# Patient Record
Sex: MALE | Race: WHITE | Employment: FULL TIME | URBAN - METROPOLITAN AREA
[De-identification: names, ages, dates, MRNs, and addresses within clinical notes are randomized per-mention and may not be internally consistent; named-entity substitution may affect disease eponyms.]

---

## 2017-09-14 ENCOUNTER — HOSPITAL ENCOUNTER (EMERGENCY)
Age: 62
Discharge: HOME | End: 2017-09-14
Attending: EMERGENCY MEDICINE
Payer: COMMERCIAL

## 2017-09-14 VITALS
SYSTOLIC BLOOD PRESSURE: 191 MMHG | RESPIRATION RATE: 16 BRPM | HEIGHT: 64 IN | TEMPERATURE: 98.1 F | OXYGEN SATURATION: 94 % | BODY MASS INDEX: 34.51 KG/M2 | HEART RATE: 62 BPM | WEIGHT: 202.13 LBS | DIASTOLIC BLOOD PRESSURE: 87 MMHG

## 2017-09-14 DIAGNOSIS — S61.219A LACERATION OF FINGER, INITIAL ENCOUNTER: Primary | ICD-10-CM

## 2017-09-14 PROCEDURE — 74011250636 HC RX REV CODE- 250/636: Performed by: PHYSICIAN ASSISTANT

## 2017-09-14 PROCEDURE — 90471 IMMUNIZATION ADMIN: CPT

## 2017-09-14 PROCEDURE — 74011000250 HC RX REV CODE- 250: Performed by: PHYSICIAN ASSISTANT

## 2017-09-14 PROCEDURE — 99283 EMERGENCY DEPT VISIT LOW MDM: CPT

## 2017-09-14 PROCEDURE — 77030018836 HC SOL IRR NACL ICUM -A

## 2017-09-14 PROCEDURE — 77030031132 HC SUT NYL COVD -A

## 2017-09-14 PROCEDURE — 75810000293 HC SIMP/SUPERF WND  RPR

## 2017-09-14 PROCEDURE — 90715 TDAP VACCINE 7 YRS/> IM: CPT | Performed by: PHYSICIAN ASSISTANT

## 2017-09-14 RX ORDER — BACITRACIN 500 UNIT/G
1 PACKET (EA) TOPICAL
Status: COMPLETED | OUTPATIENT
Start: 2017-09-14 | End: 2017-09-14

## 2017-09-14 RX ORDER — LIDOCAINE HYDROCHLORIDE 10 MG/ML
5 INJECTION INFILTRATION; PERINEURAL ONCE
Status: COMPLETED | OUTPATIENT
Start: 2017-09-14 | End: 2017-09-14

## 2017-09-14 RX ADMIN — BACITRACIN 1 PACKET: 500 OINTMENT TOPICAL at 16:43

## 2017-09-14 RX ADMIN — TETANUS TOXOID, REDUCED DIPHTHERIA TOXOID AND ACELLULAR PERTUSSIS VACCINE, ADSORBED 0.5 ML: 5; 2.5; 8; 8; 2.5 SUSPENSION INTRAMUSCULAR at 16:43

## 2017-09-14 RX ADMIN — LIDOCAINE HYDROCHLORIDE 5 ML: 10 INJECTION, SOLUTION INFILTRATION; PERINEURAL at 16:43

## 2017-09-14 NOTE — ED PROVIDER NOTES
HPI Comments: 60year old male presenting for finger laceration. No other complaints. PMHx: DM, lung CA, HTN, pericarditis, gout  Social: former smoker. . Patient is a 60 y.o. male presenting with skin laceration. The history is provided by the patient. Laceration    The incident occurred less than 1 hour ago. Pain location: right 2nd finger. The laceration is 2 cm in size. The injury mechanism is a clean knife (pt was attempting to remove a battery compartment panel using a knife when it slipped). The pain is at a severity of 4/10. The pain is mild. The pain has been constant since onset. Pertinent negatives include no numbness and no loss of motion. It is unknown when the patient last had a tetanus shot. 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 No pertinent family history. Social History     Social History    Marital status:      Spouse name: N/A    Number of children: N/A    Years of education: N/A     Occupational History    Not on file. Social History Main Topics    Smoking status: Former Smoker     Packs/day: 0.00    Smokeless tobacco: Not on file    Alcohol use Yes      Comment: occasional    Drug use: Not on file    Sexual activity: Not on file     Other Topics Concern    Not on file     Social History Narrative         ALLERGIES: Review of patient's allergies indicates no known allergies. Review of Systems   Constitutional: Negative for fever. Respiratory: Negative for shortness of breath. Cardiovascular: Negative for chest pain. Musculoskeletal: Negative for arthralgias. Skin: Positive for wound. Neurological: Negative for numbness.    All other systems reviewed and are negative. Vitals:    09/14/17 1540   BP: 191/87   Pulse: 62   Resp: 16   Temp: 98.1 °F (36.7 °C)   SpO2: 94%   Weight: 91.7 kg (202 lb 2 oz)   Height: 5' 4\" (1.626 m)            Physical Exam   Constitutional: He is oriented to person, place, and time. He appears well-developed and well-nourished. Pleasant WM   HENT:   Head: Normocephalic. Eyes: Conjunctivae are normal.   Neck: Neck supple. Cardiovascular: Normal rate. Pulmonary/Chest: Effort normal. No respiratory distress. Abdominal: He exhibits no distension. Musculoskeletal: Normal range of motion. 2.5cm linear laceration to the right 2nd finger pad. Full ROM, NVID. Neurological: He is alert and oriented to person, place, and time. Skin: Skin is warm and dry. Psychiatric: He has a normal mood and affect. MDM  Number of Diagnoses or Management Options  Diagnosis management comments: 60year old male presenting for finger pad laceration sustained while trying to use a knife to open a battery compartment. Full ROM, NVID. Wound irrigated, repaired, care instructions given. Amount and/or Complexity of Data Reviewed  Discuss the patient with other providers: yes (Dr. Eliana Toledo, ED attending)      ED Course       Wound Repair  Date/Time: 9/14/2017 5:34 PM  Performed by: [employer] provider: Dr. Eliana Toledo  Preparation: skin prepped with Shur-Clens  Pre-procedure re-eval: Immediately prior to the procedure, the patient was reevaluated and found suitable for the planned procedure and any planned medications. Location: right 2nd finger pad.   Wound length:2.5 cm or less (2.5cm)  Anesthesia: digital block    Anesthesia:  Local Anesthetic: lidocaine 1% without epinephrine  Anesthetic total: 3 mL  Foreign bodies: no foreign bodies  Irrigation solution: saline  Irrigation method: syringe  Debridement: none  Skin closure: 5-0 nylon  Number of sutures: 3  Technique: simple and interrupted  Approximation: close  Dressing: antibiotic ointment  Patient tolerance: Patient tolerated the procedure well with no immediate complications  My total time at bedside, performing this procedure was 16-30 minutes.   Comments: Wound irrigated and explored

## 2017-09-14 NOTE — DISCHARGE INSTRUCTIONS
We hope that we have addressed all of your medical concerns. The examination and treatment you received in the Emergency Department were for an emergent problem and were not intended as complete care. It is important that you follow up with your healthcare provider(s) for ongoing care. If your symptoms worsen or do not improve as expected, and you are unable to reach your usual health care provider(s), you should return to the Emergency Department. Today's healthcare is undergoing tremendous change, and patient satisfaction surveys are one of the many tools to assess the quality of medical care. You may receive a survey from the [employer] regarding your experience in the Emergency Department. I hope that your experience has been completely positive, particularly the medical care that I provided. As such, please participate in the survey; anything less than excellent does not meet my expectations or intentions. Formerly [facility], [id number] [facility] and [address] [place] [address] participate in nationally recognized quality of care measures. If your blood pressure is greater than 120/80, as reported below, we urge that you seek medical care to address the potential of high blood pressure, commonly known as hypertension. Hypertension can be hereditary or can be caused by certain medical conditions, pain, stress, or \"white coat syndrome. \"       Please make an appointment with your health care provider(s) for follow up of your Emergency Department visit. VITALS:   Patient Vitals for the past 8 hrs:   Temp Pulse Resp BP SpO2   09/14/17 1540 98.1 °F (36.7 °C) 62 16 191/87 94 %          Thank you for allowing us to provide you with medical care today. We realize that you have many choices for your emergency care needs. Please choose us in the future for any continued health care needs. Kb Tamez, [facility].   Office: [phone]            No results found for this or any previous visit (from the past 24 hour(s)). No results found. Cuts on the Hand Closed With Stitches: Care Instructions  Your Care Instructions    A cut on your hand can be on your fingers, your thumb, or the front or back of your hand. Sometimes a cut can injure the tendons, blood vessels, or nerves of your hand. The doctor used stitches to close the cut. Using stitches also helps the cut heal and reduces scarring. The doctor may have given you a splint to help prevent you from moving your hand, fingers, or thumb. If the cut went deep and through the skin, the doctor put in two layers of stitches. The deeper layer brings the deep part of the cut together. These stitches will dissolve and don't need to be removed. The stitches in the upper layer are the ones you see on the cut. You will probably have a bandage. You will need to have the stitches removed, usually in 7 to 14 days. The doctor may suggest that you see a hand specialist if the cut is very deep or if you have trouble moving your fingers or have less feeling in your hand. The doctor has checked you carefully, but problems can develop later. If you notice any problems or new symptoms, get medical treatment right away. Follow-up care is a key part of your treatment and safety. Be sure to make and go to all appointments, and call your doctor if you are having problems. It's also a good idea to know your test results and keep a list of the medicines you take. How can you care for yourself at home? · Keep the cut dry for the first 24 to 48 hours. After this, you can shower if your doctor okays it. Pat the cut dry. · Don't soak the cut, such as in a bathtub. Your doctor will tell you when it's safe to get the cut wet. · If your doctor told you how to care for your cut, follow your doctor's instructions.  If you did not get instructions, follow this general advice:  ¨ After the first 24 to 48 hours, wash around the cut with clean water 2 times a day. Don't use hydrogen peroxide or alcohol, which can slow healing. ¨ You may cover the cut with a thin layer of petroleum jelly, such as Vaseline, and a nonstick bandage. ¨ Apply more petroleum jelly and replace the bandage as needed. · Prop up the sore hand on a pillow anytime you sit or lie down during the next 3 days. Try to keep it above the level of your heart. This will help reduce swelling. · Avoid any activity that could cause your cut to reopen. · Do not remove the stitches on your own. Your doctor will tell you when to come back to have the stitches removed. · Be safe with medicines. Take pain medicines exactly as directed. ¨ If the doctor gave you a prescription medicine for pain, take it as prescribed. ¨ If you are not taking a prescription pain medicine, ask your doctor if you can take an over-the-counter medicine. When should you call for help? Call your doctor now or seek immediate medical care if:  · You have new pain, or your pain gets worse. · The skin near the cut is cold or pale or changes color. · You have tingling, weakness, or numbness near the cut. · The cut starts to bleed, and blood soaks through the bandage. Oozing small amounts of blood is normal.  · You have trouble moving the area of the hand near the cut. · You have symptoms of infection, such as:  ¨ Increased pain, swelling, warmth, or redness around the cut. ¨ Red streaks leading from the cut. ¨ Pus draining from the cut. ¨ A fever. Watch closely for changes in your health, and be sure to contact your doctor if:  · You do not get better as expected. Where can you learn more? Go to http://nyla-j carlos.info/. Enter T250 in the search box to learn more about \"Cuts on the Hand Closed With Stitches: Care Instructions. \"  Current as of: March 20, 2017  Content Version: 11.3  © 3712-8552 [employer].  Care instructions adapted under license by [employer] (which disclaims liability or warranty for this information). If you have questions about a medical condition or this instruction, always ask your healthcare professional. [address] any warranty or liability for your use of this information.

## 2017-09-14 NOTE — ED TRIAGE NOTES
Pt stated he cut his right 2nd finger pta on a knife , deep laceration noted to pad of finger, pressure dressing applied in triage

## 2017-09-14 NOTE — ED NOTES
Pt given discharge instructions, patient education, and follow up information. Pt states understanding. All questions answered. Pt discharged to home in private vehicle, ambulatory. Pt A/Ox4, RA, pain controlled.  Pt ambulatory out before repeat set of VS.

## 2018-11-29 ENCOUNTER — HOSPITAL ENCOUNTER (OUTPATIENT)
Dept: GENERAL RADIOLOGY | Age: 63
Discharge: HOME | End: 2018-11-29
Payer: COMMERCIAL

## 2018-11-29 DIAGNOSIS — R05.9 COUGH: ICD-10-CM

## 2018-11-29 PROCEDURE — 71046 X-RAY EXAM CHEST 2 VIEWS: CPT

## 2020-11-20 ENCOUNTER — VIRTUAL VISIT (OUTPATIENT)
Dept: DIABETES SERVICES | Age: 65
End: 2020-11-20
Payer: MEDICARE

## 2020-11-20 DIAGNOSIS — E11.9 TYPE 2 DIABETES MELLITUS WITHOUT COMPLICATION, WITHOUT LONG-TERM CURRENT USE OF INSULIN (HCC): Primary | ICD-10-CM

## 2020-11-20 PROCEDURE — G0108 DIAB MANAGE TRN  PER INDIV: HCPCS

## 2020-11-20 NOTE — PROGRESS NOTES
[facility] Program for Diabetes Health  Diabetes Self-Management Education   Pre-program Assessment    Reason for Referral: DSMES  Referral Source: Jose Guadalupe Bustamante MD    Metric Patient responses (11/20/2020)   A1c  (Goal: 7%)   Recent value:  Pt. Reports last A1c 7.2% 8/24/2020, up from 6.2% in 2019    See Diabetes Educator note under recommendation below. Healthy Eating     24-hour Dietary Recall:  Breakfast:    Lunch:    Dinner: Hamburger on white bun, fries, regular ketchup, diet tea  Snacks: when \"bored\"  Beverages: water, diet tea  Alcohol: no    Stage of change: Preparation     See Diabetes Educator note under recommendation below. Being Active     Physical Activity Vital Sign:  How many days during the past week have you performed physical activity where your heart beats faster and your breathing is harder than normal for 30 minutes or more? 3 days - walking the dogs    How many days in a typical week do you perform activity such as this?  3 days    Stage of change: Action     Monitoring Do you monitor your blood sugar? Yes    How often do you monitor? 1x/day    What are the range of readings? 145-150 mg/dL  Breakfast: n/a mg/dL  Lunch: n/a mg/dL  Dinner: n/a mg/dL  Bedtime: n/a mg/dL    Do you know your last A1c measurement? Yes     Do you know the meaning of the A1c? Yes    Stage of change: Action     See Diabetes Educator note under recommendation below. Taking Medications Medication Management:  Do you understand what your diabetes medications do? No    Can you afford your diabetes medications? Yes, however stopped dulaglutide when he ran out because of the cost.    How often do you miss doses of your diabetes medications? Never    Current dosing:   Key Antihyperglycemic Medications             metformin (GLUCOPHAGE) 500 mg tablet Take 500 mg by mouth two (2) times daily (with meals).       Patient also reports he is now taking 1000 mg of Metformin 2x/day and glimepiride 4 mg, 2x/day. Blood Pressure Management:  Key ACE/ARB Medications             losartan (COZAAR) 100 mg tablet Take 100 mg by mouth daily. Patient reports he is not taking losartan. Reports taking atenolol, amlodipine    Lipid Management:  Key Antihyperlipidemia Meds     The patient is on no antihyperlipidemia meds. Patient reports taking atorvastatin    Clot Prevention:  Key Anti-Platelet Anticoagulant Meds     The patient is on no antiplatelet meds or anticoagulants. Stage of change: Preparation            Healthy Coping Diabetes Skills, Confidence and Preparedness Index: Total score: 4.2  Skills: 3.9  Confidence: 4.1  Preparedness: 4.8    Stage of change: Preparation     Overall SCPI score: 4.2 Skills Score: 3.9  Low: Taking Medication(Q2),Healthy Coping(Q7),Blood Sugar Monitoring(Q8) Confidence Score: 4.1  Low: Healthy Eating(Q1),Healthy JTUDSJ(K0) Preparedness Score: 4.8  Low: Healthy Coping(Q3)  Healthy Eating Score: 3.0  Low: Confidence(Q1),Confidence(Q4) Taking Medication Score: 2.0  Low: Skills(Q2) Blood Sugar Monitoring Score: 4.2  Low: VRSQMI(C2) Reducing Risks Score: 4.8  Low: Skills(Q5),Confidence(Q3),Preparedness(Q4)  Problem Solving Score: 5.3  Low: Skills(Q6) Healthy Coping Score: 3.3  Low: Skills(Q7),Preparedness(Q3) Being Active Score: 6.5  Low: Confidence(Q5)     Reducing Risks Vaccines:  Influenza:10/2020  Pneumococcal: Has had, not sure of date. Hepatitis: Has had, not sure of date.   Examinations:  No Diabetic HM Topics for this patient     Dental exam: Last appointment was: fall 2020    Foot exam: PCP checks, pt checks daily    Heart Protection:  BP Readings from Last 2 Encounters:   09/14/17 191/87   10/04/14 137/78        No results found for: LDL, LDLC, DLDLP     Kidney Protection:  No results found for: MCACR, MCA1, MCA2, MCA3, MCAU, MCAU2, MCALPOCT    Patient-reported diabetes complications:  none    Stage of change: Action     Problem Solving Hypoglycemia Management:  What are signs and symptoms of hypoglycemia that you experience? pt reports not having hypoglycemia    How do you prevent hypoglycemia? Consistent meals/snack times and Pt reported being unaware of how to prevent hypoglycemia    How do you treat hypoglycemia? \"eat sugar\"    Hyperglycemia Management:  What are signs and symptoms of hyperglycemia that you experience? Pt reports when blood sugar changes up or down rapidly his eyes water. How can you prevent hyperglycemia? Take medication as instructed, Monitor blood glucose, Stay free of illness    Sick Day Management:  What do you do differently on sick days? Pt reports no illness in several years    Pattern Management:  Do you notice blood glucose patterns when you look at the readings in your meter or logbook? No    How do you use the blood glucose readings from your meter or logbook? Pt reported being unaware of pattern management skills    Stage of change: Preparation   Note: Content derived from the American Association of Diabetes Educators' Diabetes Education Curriculum: A Guide to Successful Self-Management (2nd edition)         Diabetes Educator Recommendations:   - Address diabetes self-care behaviors through education and support using AADE7 Curriculum. Pt to attend weekly individual sessions on reducing risks, healthy eating, being active, monitoring, taking medications, healthy coping and problem solving.     - Patient intends to focus on these diabetes self-care practices: Reducing risks, Healthy eating, Monitoring, Physical activity, Taking medications, Healthy coping and Problem solving     - Detailed Diabetes Educator note/recommendation:  I met with . Priscila Hattie today for the diabetes self-management education pre-assessment. He was diagnosed with T2DM at about age 53 and has been on a variety of DM medications and recommended meal plans over the years, he is knowledgeable about diabetes and his meds.   He is less informed about nutrition and changes in nutrition recommendations since first being diagnosed. Nutrition is his primary focus for education. In 2019 his A1c was 6.2%, as of 8/2020 it is 7.2%, he was on dulaglutide but stopped taking when the prescription ran out because of the out-of-pocket cost.  He was then started on glimepiride (about 3 months ago). His numbers started to increase significantly, occasionally reaching the 200s  He wonders if the topical testosterone he was using is related to the increase, he is now taking testosterone by injection and he has noticed his numbers coming down. The glimepiride dose was doubled about 3 weeks ago and he began to see his BGLs back down to the levels he had on the dulaglutide - fasting 140-150 consistently. He would like to further decrease his BGLs and get his A1c below 7% and closer to the 6.2% he was at last year. In terms of reducing risks, Mr. Ariella Gilbert checks his feet daily, sees PCP appropriately, is up to date on vaccinations, and reports no signs or symptoms typical of high blood sugars. He does state that when his sugars rise or drop rapidly his eyes water. Mr. Ariella Gilbert has no barriers to learning, can afford his medications and food. His wife is the  in the house, he does the grocery shopping. He states he can begin incorporating exercise into his schedule. He rates his overall health as \"fair at best.\"  He denies missing any doses of medication and is consistent with BGL monitoring. Mr. Ariella Gilbert appears to be eager to learn more about carb counting, Healthy Plate, and physical activity to help bring down his A1c.       - Pt to follow up with provider on the following: Pt stopped taking dulaglutide due to cost, may be interested in exenatide pen injector Bydureon BCise if cost effective.        Diabetes Self-Management Education Follow-up Visit: [id number]    JBTTB-91 Public Health Emergency Adaptations for Telehealth:  Ravindra Langford is a 76 y.o. male being evaluated through a synchronous (real-time) audio-video technology platform, as a substitution for an in-person encounter, to address the healthcare issues mentioned above. I was in the office. The patient was at home. A caregiver was present when appropriate. The patient and/or his healthcare decision maker, is aware that this patient-initiated, Telehealth encounter on 11/20/2020 is a billable service, with coverage as determined by his insurance carrier. He is aware that he may receive a bill and has provided verbal consent to proceed: Yes. This telehealth encounter occurred during the COVID-19 pandemic and public health emergency. Evaluation of the following organ systems was limited: Vitals/Constitutional/EENT/Resp/CV/GI//MS/Neuro/Skin/Heme-Lymph-Imm. Pursuant to the emergency declaration under the [address] [place] [address], [address] [place] [address] authority and the [employer] and [employer] Act, this Virtual Visit was conducted with patient's (and/or legal guardian's) consent, to reduce the risk of exposure to COVID-19 and provide necessary medical care. --Amie Doherty RN on 11/20/2020 at 12:00 PM    An electronic signature was used to authenticate this note.  I was in the office for the appointment and time spent: 45 minutes

## 2020-11-23 ENCOUNTER — VIRTUAL VISIT (OUTPATIENT)
Dept: DIABETES SERVICES | Age: 65
End: 2020-11-23
Payer: MEDICARE

## 2020-11-23 DIAGNOSIS — E11.65 TYPE 2 DIABETES MELLITUS WITH HYPERGLYCEMIA, WITHOUT LONG-TERM CURRENT USE OF INSULIN (HCC): Primary | ICD-10-CM

## 2020-11-23 PROCEDURE — G0108 DIAB MANAGE TRN  PER INDIV: HCPCS

## 2020-11-23 NOTE — PROGRESS NOTES
[facility] Program for Diabetes Health  Diabetes Self-Management Education   Education and Goal Plan for Session #1 and first half of 2    AADE7 Self-Care Behaviors:  Behavior Education Completed (11/23/2020)   Healthy Eating   - Impact of food on blood glucose levels  - Food sources of carbohydrates  - Meal size and portions  - Reading food labels  - Balanced plate  - Meal and snack timing  - Carbohydrate counting   - Importance of a variety of foods  - Heart healthy fats  - Reducing sodium  - Importance of not skipping meals  - Eating breakfast  - Controlling portions of carbohydrates  - Reducing sugar-sweetened beverages  - Improving water intake  - Comparing food choices  - Alcohol and diabetes  - Meal consistency     See Diabetes Educator note under recommendation below. Being Active   Not addressed during this session. Monitoring     Not addressed during this session. Taking Medications Not addressed during this session. Healthy Coping Not addressed during this session. Reducing Risks - Prevention of influenza  - Foot care and foot exam  - Dilated eye exam  - Prevention of pneumonia  - Prevention of hepatitis B  - Dental care and dental exam  - Hgb A1c target  - Long-term complications     See Diabetes Educator note under recommendation below. Problem Solving Not addressed during this session.    Note: Content derived from the American Association of Diabetes Educators' Diabetes Education Curriculum: A Guide to Successful Self-Management (2nd edition)         Diabetes Educator Recommendations:     - Continue addressing diabetes self-care behaviors through education and support in AADE7 Curriculum individual sessions on reducing risks, healthy eating, being active, monitoring, taking medications, healthy coping and problem solving.     - Continue practicing knowledge and skills relating to reducing risks, healthy eating and monitoring, being active and medications, healthy coping and problem solving, healthy eating, monitoring, being active, medications, healthy coping and problem solving to improve diabetes self-management. - Detailed Diabetes Educator note/recommendation:  I met with Mr. John Salazar today to discuss What is Diabetes, How Do I Stay Healthy, and Nutrition. He is very knowledgeable and proactive about the risks of T2DM and preventative care, so most of our time was spent focusing on nutrition. Last week his fasting BGLs ranged from 149-230. He stated the 230 was higher than usual but the 149-200 range is typical.     While talking about the risks and complications of Y1XM, we covered sleep apnea and Mr. John Salazar wondered if his CPAP machine needs to be replaced, or checked. He asked if his machine is not properly functioning could that also impact his BGLs. I agreed that the stress on his body related to sleep apnea could certainly raise his BGLs, he will check into having the machine checked or replaced. On the topic of nutrition we discussed the impact of foods (proteins, fats, and carbs) on blood glucose levels, the importance of eating three meals a day/not skipping meals/importance of breakfast, how to count carbs/carb counts between 45-60 per meal, snacking, reading food lables, portions vs servings, heart healthy fats vs unhealthy and trans fats, Healthy Plate, and nutrition apps/websites approved by the ADA. He has used Healthy Plate in the past, but carb counting is new to him. He expressed understanding how to count the carbs and some disappointment about \"limiting the amount of food\" he will be eating at each meal. To counter the smaller portions of the carb heavy foods, we discussed which foods he can have in larger portions and how the addition of fiber/whole grains can aid with satiety. Mr. John Salazar has expressed that nutrition is his biggest barrier to lowering his BGLs and A1c.   He stated that when grocery shopping, he will not buy foods that have carbs or sugars above a certain value, but admits he does not apply similar limitations to the total carbs in each meal or serving sizes. We also discussed how carb counting does not limit the foods he can have, only the number of carbs, which means he does not have to eliminate the foods he loves or foods at special occasions. He understands this and is willing to try counting carbs for 5 dinners, log what he ate, and his fasting BGL the next day before our next visit to evaluate the effects on his blood sugar. Next week we will talk about monitoring in detail along with physical activity. We will review his BGLs and meal choices as well. - Patient's Identified SMART Goal(s): - Practice healthy eating self-care behavior by counting carbs for dinner, logging what was eaten at dinner, and logging fasting BGL the following morning. over the next week. - Pt to follow up with provider on the following: Elevated liver enzyme levels, patient has questions about the values and possible \"fatty liver\" diagnosis. Also, having his CPAP machine checked and/or getting a newer machine. Diabetes Self-Management Education Follow-up Visit: 21/7/0849    [place] [address] Public Health Emergency Adaptations for Telehealth:    Mily Bell is a 76 y.o. male being evaluated through a synchronous (real-time) audio-video technology platform, as a substitution for an in-person encounter, to address the healthcare issues mentioned above. I was in the office. The patient was at home. A caregiver was present when appropriate. The patient and/or his healthcare decision maker, is aware that this patient-initiated, Telehealth encounter on 11/23/2020 is a billable service, with coverage as determined by his insurance carrier. He is aware that he may receive a bill and has provided verbal consent to proceed: Yes. This telehealth encounter occurred during the COVID-19 pandemic and public health emergency.  Evaluation of the following organ systems was limited: Vitals/Constitutional/EENT/Resp/CV/GI//MS/Neuro/Skin/Heme-Lymph-Imm. Pursuant to the emergency declaration under the [address] [place] [address] and the [employer] and [employer] Act, this Virtual Visit was conducted with patient's (and/or legal guardian's) consent, to reduce the risk of exposure to COVID-19 and provide necessary medical care. --Catherine Montelongo RN on 11/23/2020 at 2:40 PM    An electronic signature was used to authenticate this note.  I was in the office for the appointment and time spent: 95 minutes

## 2020-12-03 ENCOUNTER — VIRTUAL VISIT (OUTPATIENT)
Dept: DIABETES SERVICES | Age: 65
End: 2020-12-03
Payer: MEDICARE

## 2020-12-03 DIAGNOSIS — E11.9 TYPE 2 DIABETES MELLITUS WITHOUT COMPLICATION, WITHOUT LONG-TERM CURRENT USE OF INSULIN (HCC): Primary | ICD-10-CM

## 2020-12-03 PROCEDURE — G0108 DIAB MANAGE TRN  PER INDIV: HCPCS

## 2020-12-03 NOTE — PROGRESS NOTES
[facility] for Diabetes Health  Diabetes Self-Management Education   Education and Goal Plan for Session #2 - postponed remaining material until 12/10  - patient has not received his patient handbook in the mail. This session included reviewing the results of the patient's SMART Goal, carb counting, BGLs    AADE7 Self-Care Behaviors:  Behavior Education Completed (12/3/2020)   Healthy Eating   - Food sources of carbohydrates  - Meal size and portions  - Carbohydrate counting      Being Active   Not addressed during this session. Monitoring     - Frequency of monitoring  - Logging blood glucose results   Taking Medications - Medication review   Healthy Coping Not addressed during this session. Reducing Risks Not addressed during this session. Problem Solving Not addressed during this session. Note: Content derived from the American Association of Diabetes Educators' Diabetes Education Curriculum: A Guide to Successful Self-Management (2nd edition)         Diabetes Educator Recommendations:     - Continue addressing diabetes self-care behaviors through education and support in AADE7 Curriculum individual sessions on reducing risks, healthy eating, being active, monitoring, taking medications, healthy coping and problem solving.     - Continue practicing knowledge and skills relating to reducing risks, healthy eating and monitoring, being active and medications, healthy coping and problem solving, healthy eating, monitoring, being active, medications, healthy coping and problem solving to improve diabetes self-management. - Detailed Diabetes Educator note/recommendation:  Mr. Robert Thibodeaux and I met today to cover the second half of session 2 - Monitoring and session 3 - Medications and Physical Activity, however because Mr. Robert Thibodeaux has not yet received his patient handbook we decided to reschedule those topics for 12/10/2020. Today we discussed his SMART goal of counting carbs at 5 dinners/7 dinners. He accomplished his goal and he found that the carbs in his current diet do fall within the recommended 45-60 grams carb/meal.  His fasting blood sugars continue to run higher than he would like ranging between 190s-230s. With Mr. Springer Doyne well controlled and his daily physical activity set where it is, his medications are the next element that may need to be considered. Mr. Ricky Sofia stated that he plans to call his PCP to discuss his medications and the possibility that his testosterone injections are raising his glucose. Before the glimepiride, that Mr. Ricky Sofia is on now, he took dulaglutide , which worked much better for him, however he stopped taking it because of the expense. We reviewed the natural progression of diabetes, and that it is common for patients to be on more than one diabetes medication at a time because of how and where they work in the body. He expressed understanding. After our visit I consulted with Dr. Kristen Kang, medical director of the Program for Diabetes Health, about the possibility that Mr. Juan Ferrell blood sugars are higher because he is taking testosterone injections, the answer is no. Next week we will resume DSMES according to the curriculum. - Patient's Identified SMART Goal(s): - New goal to be set on 12/10/2020     - Pt to follow up with provider on the following: Possible medication changes to better control blood sugars. Diabetes Self-Management Education Follow-up Visit: 70/06/0661    [id number] Public Health Emergency Adaptations for Telehealth:    Ghazala Riley is a 76 y.o. male being evaluated through a synchronous (real-time) audio-video technology platform, as a substitution for an in-person encounter, to address the healthcare issues mentioned above. I was in the office. The patient was at home. A caregiver was present when appropriate.  The patient and/or his healthcare decision maker, is aware that this patient-initiated, Telehealth encounter on 12/3/2020 is a billable service, with coverage as determined by his insurance carrier. He is aware that he may receive a bill and has provided verbal consent to proceed: Yes. This telehealth encounter occurred during the COVID-19 pandemic and public health emergency. Evaluation of the following organ systems was limited: Vitals/Constitutional/EENT/Resp/CV/GI//MS/Neuro/Skin/Heme-Lymph-Imm. Pursuant to the emergency declaration under the [address] [place] [address], [address] [place] [address] authority and the [employer] and [employer] Act, this Virtual Visit was conducted with patient's (and/or legal guardian's) consent, to reduce the risk of exposure to COVID-19 and provide necessary medical care. --Jessica Torres RN on 12/3/2020 at 12:26 PM    An electronic signature was used to authenticate this note.  I was in the office for the appointment and time spent: 27   minutes

## 2020-12-10 ENCOUNTER — VIRTUAL VISIT (OUTPATIENT)
Dept: DIABETES SERVICES | Age: 65
End: 2020-12-10

## 2020-12-10 DIAGNOSIS — E11.65 TYPE 2 DIABETES MELLITUS WITH HYPERGLYCEMIA, WITHOUT LONG-TERM CURRENT USE OF INSULIN (HCC): Primary | ICD-10-CM

## 2020-12-10 NOTE — PROGRESS NOTES
[address] [place] [address] for Diabetes Health  Diabetes Self-Management Education   Education and Goal Plan for Session #2 (second half - monitoring) and #3    AADE7 Self-Care Behaviors:  Behavior Education Completed (12/10/2020)   Healthy Eating   - Impact of food on blood glucose levels  - Food sources of carbohydrates  - Meal size and portions  - Carbohydrate counting   - Heart healthy fats     Being Active   - Effects of exercise on blood glucose  - Goals for exercise  - Types of exercise     Monitoring     - ADA blood glucose targets  - Frequency of monitoring  - Blood glucose schedule  - A1c interpretation  - Monitoring blood glucose trends per meter  - Alternative site testing  - Glucose monitoring technique  - Disposal of used lancets or needles  - Logging blood glucose results   Taking Medications - Medication review  - Medication schedule  - Insulin: Rapid-acting and long-acting   Healthy Coping Not addressed during this session. Reducing Risks Not addressed during this session. Problem Solving - Hypoglycemia signs/symptoms  - Hypoglycemia prevention  - Hypoglycemia treatment: Rule of 15   Note: Content derived from the American Association of Diabetes Educators' Diabetes Education Curriculum: A Guide to Successful Self-Management (2nd edition)         Diabetes Educator Recommendations:     - Continue addressing diabetes self-care behaviors through education and support in AADE7 Curriculum individual sessions on reducing risks, healthy eating, being active, monitoring, taking medications, healthy coping and problem solving.     - Continue practicing knowledge and skills relating to reducing risks, healthy eating and monitoring, being active and medications, healthy coping and problem solving, healthy eating, monitoring, being active, medications, healthy coping and problem solving to improve diabetes self-management.     - Detailed Diabetes Educator note/recommendation:  I met with Mr. Raquel Albright today, we covered monitoring, medications, and physical activity. Mr. Mine Perry BGLs continue to run higher than he expects with fasting and post dinner numbers both running 175-200s. He has been using the Carb Manager leanna for 1 week to log and calculate his per meal carbs. Mr. Tia Ryan states his mealtime carb counts are consistently below or between 45-60 g. He eats the same breakfast daily with  A total of 32g carbs. The following are the carbs for lunch between 12/1 and 12/5: 32, 48, 50, 28, and 72. For dinner same dates: 32, 79, skipped, 72, 56. After reviewing the numbers we discussed the carb counts in the 70s (pizza/rice) and how even though other meals may fall within the 45-60 g or less carb range, those carb heavy meals do significantly increase BGLs. We further discussed that the carbs at each meal need to be consistently in range and his range may need to be tighter to have a greater impact on his BGLs. He has agreed to try keeping his dinner carbs between 45-55 g so we can see what effect it has on his BGLs next week. He half jokingly states that I am trying \"to starve him to death. \" in response we reviewed low carb options like non-starchy vegetables that he can have second servings of without impacting his BGLs. I also asked that he compare the carb counts on the Carb Manager leanna to other sources, like the [employer], as it is an [employer] and I want to make sure the information he is getting is correct. He expressed understanding. Mr. Tia Ryan did does try to plan lunches for work, but states he often forgets to make the lunch and ends up eating fast food. He is switching out fries for chili at [facility], which is a significant difference in carb content. He is going to work on [employer] consistently. While covering physical activity and its impact on BGLs, Mr. Tia Ryan stated he walks approximately 3000 steps/1 mile each day walking his dogs after work.   He says the walk is vigorous enough to [employer] up a sweat. \"   Increasing the distance he walks is not something he is currently interested in trying. He expressed understanding of the recommendations for 150 minutes of physical activity each week, the types of recommended physical activity, and chair exercised that he could do at work during the day when he is mostly sitting. Mr. Kelli Garcia has reached out to his PCP, Dr. Rosalba Hendricks, to discuss if his meds are adequately impacting his BGLs or if they need to be adjusted. I would like to see if a decrease in carb intake has any marked effect on BGLs as well. Lastly, I did let Mr. Kelli Garcia know the testosterone injections are not likely to cause his BGLs to increase. He states he has another injection the beginning of next week and will watch for any changes. - Patient's Identified SMART Goal(s): - Practice healthy eating self-care behavior by continuing to count and log carbs for all meals and keep carb count for dinner to less than or equal to 55 grams. over the next week. - Pt to follow up with provider on the following: possible medication changes related to decreased blood glucose control. Diabetes Self-Management Education Follow-up Visit: 35/09/2070    [id number] Public Health Emergency Adaptations for Telehealth:    Lay Wolfe is a 76 y.o. male being evaluated through a synchronous (real-time) audio-video technology platform, as a substitution for an in-person encounter, to address the healthcare issues mentioned above. I was in the office. The patient was at home. A caregiver was present when appropriate. The patient and/or his healthcare decision maker, is aware that this patient-initiated, Telehealth encounter on 12/10/2020 is a billable service, with coverage as determined by his insurance carrier. He is aware that he may receive a bill and has provided verbal consent to proceed: Yes. This telehealth encounter occurred during the COVID-19 pandemic and public health emergency. Evaluation of the following organ systems was limited: Vitals/Constitutional/EENT/Resp/CV/GI//MS/Neuro/Skin/Heme-Lymph-Imm. Pursuant to the emergency declaration under the [address] [place] [address], [address] [place] [address] and the [employer] and [employer] Act, this Virtual Visit was conducted with patient's (and/or legal guardian's) consent, to reduce the risk of exposure to COVID-19 and provide necessary medical care. --Crow Avilez RN on 12/10/2020 at 11:35 AM    An electronic signature was used to authenticate this note.  I was in the office for the appointment and time spent: 77 minutes

## 2020-12-17 ENCOUNTER — VIRTUAL VISIT (OUTPATIENT)
Dept: DIABETES SERVICES | Age: 65
End: 2020-12-17
Payer: MEDICARE

## 2020-12-17 DIAGNOSIS — E11.9 TYPE 2 DIABETES MELLITUS WITHOUT COMPLICATION, WITHOUT LONG-TERM CURRENT USE OF INSULIN (HCC): Primary | ICD-10-CM

## 2020-12-17 PROCEDURE — G0108 DIAB MANAGE TRN  PER INDIV: HCPCS | Performed by: INTERNAL MEDICINE

## 2020-12-17 NOTE — PROGRESS NOTES
[facility] for Diabetes [facility]  Diabetes Self-Management Education   Education and Goal Plan for Session #4     AADE7 Self-Care Behaviors:  Behavior Education Completed (12/17/2020)   Healthy Eating   - Impact of food on blood glucose levels  - Meal size and portions  - Balanced plate  - Carbohydrate counting   - Importance of not skipping meals     Being Active   - Effects of exercise on blood glucose     Monitoring     - ADA blood glucose targets  - Frequency of monitoring  - Blood glucose schedule   Taking Medications Not addressed during this session. Healthy Coping - Emotions in response to diagnosis  - Coping skills  - Stress management  - Obtaining support   Reducing Risks - Hgb A1c target  - Long-term complications   Problem Solving - Hypoglycemia signs/symptoms  - Hypoglycemia prevention  - Hypoglycemia treatment: Rule of 15  - Hyperglycemia signs/symptoms  - Hyperglycemia prevention  - Sick day management   Note: Content derived from the American Association of Diabetes Educators' Diabetes Education Curriculum: A Guide to Successful Self-Management (2nd edition)         Diabetes Educator Recommendations:     - Continue addressing diabetes self-care behaviors through education and support in AADE7 Curriculum individual sessions on reducing risks, healthy eating, being active, monitoring, taking medications, healthy coping and problem solving.     - Continue practicing knowledge and skills relating to reducing risks, healthy eating and monitoring, being active and medications, healthy coping and problem solving, healthy eating, monitoring, being active, medications, healthy coping and problem solving to improve diabetes self-management. - Detailed Diabetes Educator note/recommendation:  Mr. Yaneth Christian and I met today to cover session 4 - coping skills and problem solving.       Since last week he has continued to count carbs for meals at home, which he reports are between 45-55g carbs most of the time, but occasionally go as high as 60-70g carbs. He continues to eat lunches out, but tries to choose items that are lower in carbs and include more fresh vegetables like salads from the grocery store. He does sometimes choose a frozen meal that can be microwaved and he states he selects only those in the 45-55g carb range. Mr. Ariella Gilbert reports eating smaller portions of higher carbfoods like baked spaghetti, but states his BGLs still run high after these meals. His wife cooks their meals, but has not made changes to what they eat as he has not asked her to. He has modified his meal plan by eating smaller portions, not different foods. His fasting BGLs ranged 150-200s last week, he recognizes these are outside the ADA target range. In terms of Mr. Shaila Garcia coping skills and problem solving, he states he has never felt angry, shocked, or fearful of his diabetes diagnosis or self-management. He states that he does his part to manage his diabetes based on what his doctors tell him to do and \"it is what it is. \"  His support system is his providers. He monitors what he eats and his blood sugars on his own. I suggested he visit the ADA's diabetes. org website for information and community support. He denies symptoms of depression. He denies high levels of stress, but does not have any hobbies or outlets for stress, I reinforced that physical activity relieves stress and helps decrease blood sugars. He continues to walk the dog for approximately 30 minutes on days when the weather permits. When asked about this diabetes self-management program, he states that it has increased his knowledge of what diabetes is, what is happening in his body, and how it can be treated, however he feels he has reached a level of physical activity and control over his carb intake that he is comfortable maintaining and does not wish to make any significant changes at this time.   He feels that medication is the remaining factor that can be adjusted to improve his BGLs. He plans to make an appointment to see Dr. Colonel Justice before his scheduled appointment in February 2021 to discuss this. As I discussed with Mr. Dl Brewer, his diabetes plan has to work for him and for now, this is what he is most comfortable doing. Mr. Dl Brewer expressed understanding the 15:15 rule to treat hypoglycemia, but fast carbs versus slow carbs (those combined with proteins or fats) were covered for clarity. He expressed previous knowledge of problem solving skills and denies any barriers to diabetes self-management. I will follow up with Mr. Dl Brewer in six weeks for a post-assessment.       - Patient's Identified SMART Goal(s): - n/a, last session     - Pt to follow up with provider on the following: possible medication change     Diabetes Self-Management Education Follow-up Visit: 6/80/6460    [facility] Adaptations for Telehealth:    Jason Horne is a 76 y.o. male being evaluated through a synchronous (real-time) audio-video technology platform, as a substitution for an in-person encounter, to address the healthcare issues mentioned above. I was in the office. The patient was at home. A caregiver was present when appropriate. The patient and/or his healthcare decision maker, is aware that this patient-initiated, Telehealth encounter on 12/17/2020 is a billable service, with coverage as determined by his insurance carrier. He is aware that he may receive a bill and has provided verbal consent to proceed: Yes. This telehealth encounter occurred during the COVID-19 pandemic and public health emergency. Evaluation of the following organ systems was limited: Vitals/Constitutional/EENT/Resp/CV/GI//MS/Neuro/Skin/Heme-Lymph-Imm.   Pursuant to the emergency declaration under the [facility], [address] [place] [address] authority and the [employer] Act, this Virtual Visit was conducted with patient's (and/or legal guardian's) consent, to reduce the risk of exposure to COVID-19 and provide necessary medical care. --Kelly Rivera RN on 12/17/2020 at 11:05 AM    An electronic signature was used to authenticate this note.  I was in the office for the appointment and time spent: 48 minutes

## 2021-01-28 ENCOUNTER — VIRTUAL VISIT (OUTPATIENT)
Dept: DIABETES SERVICES | Age: 66
End: 2021-01-28
Payer: MEDICARE

## 2021-01-28 DIAGNOSIS — E11.9 TYPE 2 DIABETES MELLITUS WITHOUT COMPLICATION, WITHOUT LONG-TERM CURRENT USE OF INSULIN (HCC): Primary | ICD-10-CM

## 2021-01-28 PROCEDURE — G0108 DIAB MANAGE TRN  PER INDIV: HCPCS

## 2021-01-28 NOTE — PROGRESS NOTES
[employer] [facility] for Diabetes [facility]  Diabetes Self-Management Education & Support Program  Post-program evaluation    EVALUATION @ [facility] completed 6 hours of diabetes self-management education. The participant acquired new knowledge and demonstrated new skills during the program.     The participant worked on the following SMART GOAL(s) to improve their diabetes self-management:    1. Checking in with PCP about possible DM medication change. [x] 0-24%     [] 25-49%     [] 50-74%     [] %      2. Practice healthy eating self-care behavior by continuing to count and log carbs for all meals and keep carb count for dinner to less than or equal to 55 grams. over the next week. [] 0-24%     [] 25-49%     [] 50-74%     [x] %  The participant improved their Diabetes Skills, Confidence and Preparedness Index (scored out of 7): Total score: 6.5 - improved  Skills: 6.2 - improved  Confidence: 6.3 - improved  Preparedness: 7.0 - improved    FINAL RECOMMENDATIONS:  Mr. Nahomy Samuels reports his fasting BGLs have climbed some since we last met in December 2020 when they ranged from 150s-200s; they now range from 180s-250s. He feels that the upward trend in BGLs may be related to his medications not working as well as intended. He states his nutrition and meal plan remain the same, typically ranging from 30-45g carbs for breakfast, 45-60g carbs at lunch and at dinner with little to no snacking between meals, and lunch out about twice a week with improved meal choices as compared to before DSMES. He adds that he does not measure his portions or look up carb counts for particular foods on a regular basis, but is comfortable estimating the number of carbs in each meal.  His physical activity remains the same.   Several times during DSMES, Mr. Nahomy Samuels mentioned speaking to his PCP about reevaluating his DM medications as he saw a rise in BGLs after stopping dulaglutide and starting glimepiride, as of today he has not yet had this conversation, but he states he will discuss this with his PCP at his scheduled appointment in February. Mr. Milburn Phalen states he is feeling well overall despite the increased fasting BGLs. I recommended to Mr. Milburn Phalen that he verify the number of carbs at each meal and modify as needed in addition to speaking with his PCP about trying a different DM medication regimen. He is aware that he has remaining DSMES time he can use if he would like to discuss any changes that he makes after seeing his PCP in a few weeks. I will check with the PCPs office for the results of Mr. Isabella Munoz latest A1c. Next provider visit is scheduled for: n/a unless patient determines he needs additional education. Jacquelyn Del Valle RN on 1/28/2021 at 8:03 AM    Metric Patient's responses (1/28/2021) Areas for improvement     Healthy Eating       The participant is using the Healthy Plate to control carbohydrate intake Yes    The participant reads food labels accurately Yes   Mastered the carb counting skill, needs to periodically check to see if his unmeasured food portions are less than or greater than the recommended serving size equaling about 15g carbs to accurately calculate grams of carbs per meal.     Being Active       The participant performs physical activity where your heart beats faster and your breathing is harder than normal for 30 minutes or more? 3 days    In a typical week, the participant performs physical activity 3 days          Monitoring   The participant is monitoring their blood sugars?  Yes    The participant is monitoring 1x/day    Blood glucoses are ranging:   Breakfast: 180s-250s mg/dL  Lunch: n/a mg/dL  Dinner: n/a mg/dL  Bedtime: n/a mg/dL    The participant improved their A1c A1c will be checked at next PCP visit in Feb. 2021    The participant understands the meaning of the A1c Yes          Taking Medications   The participant understands what their diabetes medications do Yes    The participant can afford your diabetes medications Yes    The participant does not miss doses of their diabetes medications Never          Healthy Coping     The participant feels supported by family, friends and others related to their diabetes self-care practices Yes    The participant plans on utilizing the following community resources after completion of the program: ADA Nutrition and diabetes. org        Reducing Risks   Vaccines:  Influenza: Given at PCP office outside [facility] of 2020  Pneumococcal: Given at PCP office outside [facility] of 2020  Hepatitis: Pt states he has had vaccination    Examinations:  Diabetic Foot and Eye Exam HM Status   Topic Date Due    Diabetic Foot Care  01/11/1965    Eye Exam  01/11/1965       Dental exam: DUE    Foot exam: Feb 2021    Heart Protection:  BP Readings from Last 2 Encounters:   09/14/17 191/87   10/04/14 137/78        No results found for: LDL, LDLC, DLDLP     Key Anti-Platelet Anticoagulant Meds     The patient is on no antiplatelet meds or anticoagulants. Kidney Protection:  No results found for: George Kennedy, [facility], [address], MCAU, [address] [facility]   The participant would benefit from additional attention to:    Vaccines:  [] Influenza  [] Pneumococcal  [] Hepatitis    Examinations:  [] Dilated eye exam  [] Dental exam  [] Foot exam    Other:  [] Reviewing long-term complications     Problem Solving   Hypoglycemia Management:  The participant knows the signs and symptoms of hypoglycemia pt denies incidences of hypoglycemia    The participant knows how to prevent hypoglycemia? Consistent meals/snack times and Take medications as instructed    The participant knows how to treat hypoglycemia? Rule of 15    Hyperglycemia Management:  The participant knows their signs and symptoms of hyperglycemia pt denies incidences of hyperglycemia    The participant knows how to prevent hyperglycemia?  Take medication as instructed, Focus on carbohydrate counting/meal planning, Monitor blood glucose, Stay free of illness    Sick Day Management:  The participant knows what to do differently on sick days? Stay hydrated, Check blood glucose every 2-4 hours, Eat meals, soft foods, or drink caloric beverages every 4 hours, Take diabetes medication as instructed by provider, Take over-the-counter medications as instructed by provider    Pattern Management:  The participant can notice blood glucose patterns when looking at their blood glucose readings Yes           Note: Content derived from the American Association of Diabetes Educators' Diabetes Education Curriculum: A Guide to Successful Self-Management (2nd edition)      [id number]-[id number] Public Health Emergency Adaptations for Telehealth:    Marybel Jiménez is a 78 y.o. male being evaluated through a synchronous (real-time) audio-video technology platform, as a substitution for an in-person encounter, to address the healthcare issues mentioned above. A caregiver was present when appropriate. I was in the office. The patient was at home. The patient and/or his healthcare decision maker, is aware that this patient-initiated, Telehealth encounter on 1/28/2021 is a billable service, with coverage as determined by his insurance carrier. He is aware that he may receive a bill and has provided verbal consent to proceed: Yes. This telehealth encounter occurred during the COVID-19 pandemic and public health emergency. Evaluation of the following organ systems was limited: Vitals/Constitutional/EENT/Resp/CV/GI//MS/Neuro/Skin/Heme-Lymph-Imm. Pursuant to the emergency declaration under the [address] [place] [address] authority and the [employer] and [employer] Act, this Virtual Visit was conducted with patient's (and/or legal guardian's) consent, to reduce the risk of exposure to COVID-19 and provide necessary medical care.      Time in appointment: 31 minutes

## 2023-05-22 RX ORDER — LOSARTAN POTASSIUM 100 MG/1
100 TABLET ORAL DAILY
COMMUNITY

## 2023-05-22 RX ORDER — MINOXIDIL 2.5 MG/1
5 TABLET ORAL 2 TIMES DAILY
COMMUNITY

## 2023-05-22 RX ORDER — MONTELUKAST SODIUM 10 MG/1
10 TABLET ORAL DAILY
COMMUNITY